# Patient Record
Sex: FEMALE | Race: WHITE | Employment: PART TIME | URBAN - METROPOLITAN AREA
[De-identification: names, ages, dates, MRNs, and addresses within clinical notes are randomized per-mention and may not be internally consistent; named-entity substitution may affect disease eponyms.]

---

## 2017-04-26 ENCOUNTER — HOSPITAL ENCOUNTER (EMERGENCY)
Age: 42
Discharge: HOME | End: 2017-04-26
Attending: EMERGENCY MEDICINE
Payer: COMMERCIAL

## 2017-04-26 VITALS
DIASTOLIC BLOOD PRESSURE: 67 MMHG | OXYGEN SATURATION: 99 % | WEIGHT: 225 LBS | RESPIRATION RATE: 16 BRPM | SYSTOLIC BLOOD PRESSURE: 116 MMHG | BODY MASS INDEX: 36.16 KG/M2 | TEMPERATURE: 98.4 F | HEIGHT: 66 IN | HEART RATE: 101 BPM

## 2017-04-26 DIAGNOSIS — N76.2 CELLULITIS OF LABIA MAJORA: Primary | ICD-10-CM

## 2017-04-26 PROCEDURE — 99283 EMERGENCY DEPT VISIT LOW MDM: CPT

## 2017-04-26 PROCEDURE — 75810000289 HC I&D ABSCESS SIMP/COMP/MULT

## 2017-04-26 PROCEDURE — 77030019895 HC PCKNG STRP IODO -A

## 2017-04-26 RX ORDER — TRAMADOL HYDROCHLORIDE 50 MG/1
50 TABLET ORAL
Qty: 20 TAB | Refills: 0 | Status: SHIPPED | OUTPATIENT
Start: 2017-04-26

## 2017-04-26 RX ORDER — SULFAMETHOXAZOLE AND TRIMETHOPRIM 800; 160 MG/1; MG/1
2 TABLET ORAL 2 TIMES DAILY
COMMUNITY

## 2017-04-26 RX ORDER — CEPHALEXIN 500 MG/1
1000 CAPSULE ORAL 2 TIMES DAILY
COMMUNITY

## 2017-04-26 RX ORDER — VALACYCLOVIR HYDROCHLORIDE 1 G/1
1000 TABLET, FILM COATED ORAL 2 TIMES DAILY
COMMUNITY

## 2017-04-27 NOTE — DISCHARGE INSTRUCTIONS
Complete entire course of antibiotics. Follow up with Ob/Gyn tomorrow. Return to ED if redness or swelling worsens or does not improve in 24-48hrs, or if you develop high fevers.

## 2017-04-27 NOTE — ED PROVIDER NOTES
HPI Comments: 45yo F with swelling and pain at the external labia. Started 2 days ago. Was seen by Rox Whitaker yesterday and started on valtrex, keflex, and bactrim. The pain and swelling ahs continued to worsen. 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 She has felt nauseous. No fevers. No wound or drainage. Patient is a 37 y.o. female presenting with abscess. Abscess           Past Medical History:   Diagnosis Date    Blood in urine     Gross hematuria     Right flank pain        Past Surgical History:   Procedure Laterality Date    HX CHOLECYSTECTOMY           Family History:   Problem Relation Age of Onset    Family history unknown: Yes       Social History     Social History    Marital status:      Spouse name: N/A    Number of children: N/A    Years of education: N/A     Occupational History    Not on file. Social History Main Topics    Smoking status: Never Smoker    Smokeless tobacco: Not on file    Alcohol use 0.0 oz/week     0 Standard drinks or equivalent per week    Drug use: Not on file    Sexual activity: Yes     Birth control/ protection: IUD     Other Topics Concern    Not on file     Social History Narrative         ALLERGIES: Review of patient's allergies indicates no known allergies. Review of Systems   Constitutional: Negative for fever. HENT: Negative for facial swelling. Eyes: Negative for visual disturbance. Respiratory: Negative for shortness of breath. Cardiovascular: Negative for chest pain. Gastrointestinal: Negative for abdominal pain. Genitourinary: Negative for dysuria. Musculoskeletal: Negative for neck pain. Skin: Positive for color change. Negative for rash. Neurological: Negative for dizziness. Psychiatric/Behavioral: Negative for confusion. All other systems reviewed and are negative.       Vitals:    04/26/17 2130 04/26/17 2148 04/26/17 2151   BP: (!) 152/96 106/46    Pulse: (!) 101 Resp: 16     Temp: 98.4 °F (36.9 °C)     SpO2: 97%  98%   Weight: 102.1 kg (225 lb)     Height: 5' 6\" (1.676 m)              Physical Exam   Constitutional: She is oriented to person, place, and time. She appears well-developed and well-nourished. No distress. HENT:   Head: Normocephalic and atraumatic. Eyes: Conjunctivae are normal.   Neck: Normal range of motion. Cardiovascular: Normal rate and regular rhythm. Pulmonary/Chest: Effort normal.   Abdominal: She exhibits no distension. Musculoskeletal: Normal range of motion. Neurological: She is alert and oriented to person, place, and time. Skin: Skin is warm and dry. She is not diaphoretic.   [id number] area of induration, swelling, and erythema, of the mons and left external labia. No fluctuance. Small ulceration on labia. No drainage. Psychiatric: She has a normal mood and affect. Nursing note and vitals reviewed. MDM  Number of Diagnoses or Management Options  Cellulitis of labia majora: new and does not require workup  Diagnosis management comments: I&D performed of left labia. Serosanguinous drainage. Packing placed. She has f/u with ob/gyn tomorrow. Plan for removal of packing tomorrow. Continue abx. Discussed treatment plan, return precautions, symptomatic relief, and expected time to improvement. All questions answered. Patient is stable for discharge and outpatient management.         ED Course       I&D Slim Simple  Date/Time: 4/26/2017 10:59 PM  Performed by: JAMIE Alston  Authorized by: Mane MORELOS     Consent:     Consent obtained:  Verbal    Consent given by:  Patient    Risks discussed:  Bleeding, incomplete drainage, pain, infection and damage to other organs    Alternatives discussed:  No treatment, delayed treatment, alternative treatment, observation and referral  Location:     Type:  Abscess    Size:  15cm    Location: labia   Pre-procedure details:     Skin preparation:  Betadine  Anesthesia (see MAR for exact dosages): Anesthesia method:  Local infiltration    Local anesthetic:  Lidocaine 1% w/o epi  Procedure type:     Complexity:  Simple  Procedure details:     Needle aspiration: no      Incision types:  Single straight    Scalpel blade:  11    Wound management:  Probed and deloculated    Drainage:  Serosanguinous    Drainage amount:  Scant    Wound treatment:  Wound left open    Packing materials:  1/4 in iodoform gauze  Post-procedure details:     Patient tolerance of procedure: Tolerated well, no immediate complications        Diagnosis:   1. Cellulitis of labia majora          Disposition: home    Follow-up Information     Follow up With Details Comments Contact Info    [facility]  [address] [facility] [address] [place] [address] EMERGENCY DEPT  Immediately if symptoms worsen [address]          Patient's Medications   Start Taking    TRAMADOL (ULTRAM) 50 MG TABLET    Take 1 Tab by mouth every six (6) hours as needed for Pain. Max Daily Amount: 200 mg. Continue Taking    CEPHALEXIN (KEFLEX) 500 MG CAPSULE    Take 1,000 mg by mouth two (2) times a day. LEVONORGESTREL (MIRENA) 20 MCG/24 HR (5 YEARS) IUD    1 Each by IntraUTERine route once. TRIMETHOPRIM-SULFAMETHOXAZOLE (BACTRIM DS, SEPTRA DS) 160-800 MG PER TABLET    Take 2 Tabs by mouth two (2) times a day. VALACYCLOVIR (VALTREX) 1 GRAM TABLET    Take 1,000 mg by mouth two (2) times a day.    These Medications have changed    No medications on file   Stop Taking    No medications on file     George Garcia PA-C